# Patient Record
Sex: FEMALE | Race: WHITE | Employment: UNEMPLOYED | URBAN - METROPOLITAN AREA
[De-identification: names, ages, dates, MRNs, and addresses within clinical notes are randomized per-mention and may not be internally consistent; named-entity substitution may affect disease eponyms.]

---

## 2020-04-09 ENCOUNTER — VIRTUAL VISIT (OUTPATIENT)
Dept: PRIMARY CARE CLINIC | Age: 35
End: 2020-04-09

## 2020-04-09 DIAGNOSIS — F32.A DEPRESSION, UNSPECIFIED DEPRESSION TYPE: ICD-10-CM

## 2020-04-09 DIAGNOSIS — Z76.89 ENCOUNTER TO ESTABLISH CARE WITH NEW DOCTOR: ICD-10-CM

## 2020-04-09 DIAGNOSIS — F41.9 ANXIETY: Primary | ICD-10-CM

## 2020-04-09 DIAGNOSIS — G47.9 SLEEP DISTURBANCE: ICD-10-CM

## 2020-04-09 RX ORDER — BUSPIRONE HYDROCHLORIDE 7.5 MG/1
7.5 TABLET ORAL 3 TIMES DAILY
Qty: 90 TAB | Refills: 0 | Status: SHIPPED | OUTPATIENT
Start: 2020-04-09

## 2020-04-09 RX ORDER — TRAZODONE HYDROCHLORIDE 50 MG/1
50 TABLET ORAL
Qty: 30 TAB | Refills: 1 | Status: SHIPPED | OUTPATIENT
Start: 2020-04-09

## 2020-04-09 NOTE — PROGRESS NOTES
Consent: Renato aHmmer, who was seen by synchronous (real-time) audio-video technology, and/or her healthcare decision maker, is aware that this patient-initiated, Telehealth encounter on 4/9/2020 is a billable service, with coverage as determined by her insurance carrier. She is aware that she may receive a bill and has provided verbal consent to proceed: NA - Consent obtained within past 12 months. Assessment & Plan:   Diagnoses and all orders for this visit:    1. Anxiety  -   Start   busPIRone (BUSPAR) 7.5 mg tablet; Take 1 Tab by mouth three (3) times daily. Advised that she needs to make appointment with psychiatrist. [facility]. Patient reports that she called couple of practices but due to this COVID-19 issues they are not taking any new patient. 2. Depression, unspecified depression type  -   Start   traZODone (DESYREL) 50 mg tablet; Take 1 Tab by mouth nightly. Patient expected to start other meds as well but I advised that Its not safe to start different psych med at a time due to the concern of side effects. Advised to have a close follow up until she has an appointment with psychiatrist. Bertha Wyman that I will adjust, add med as we go until she has appointment with psych as much as I can. 3. Sleep disturbance  -    Start  traZODone (DESYREL) 50 mg tablet; Take 1 Tab by mouth nightly. 4. Encounter to establish care with new doctor       Same as #1,2,3. Follow-up and Dispositions    · Return in about 1 month (around 5/9/2020), or if symptoms worsen or fail to improve, for depression, anxiety. .                 I spent at least 30 minutes with this new patient, and >50% of the time was spent counseling and/or coordinating care regarding anxiety, depression. 712  Subjective:   Renato Hammer is a 27 y.o. female who was seen for Establish Care; Anxiety; and Depression    Hx of anxiety and depression for many years. She think she was diagnosed with depression and anxiety since she was 22 and she believe she her symptoms started after she had her first child when she was 21 y/o. She was diagnosed with postpartum depression and started on different meds but  she admit that she would always stop taking the meds after 6 months of trial.     Seen psychiatrist about 8 years ago and diagnosed with Bipolar 1 disorder. She was started on Abilify, Risperdal but she took for few months and stopped by herself. She has been experiencing mood swings. Feels depressed some days and some days she feels active. States that she jumps into different tasks, can't finish. Feels anxious. 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 Has been experiencing anxiety at work. She moved from [place] on 3/6/2020. She has 8,10 and 11 y/o childres in [place]. She does not have custody of her Childrens. States that she moves back and forth from different states. She was on Prozac, zoloft, benzos in different times. She stopped those meds by herself. She reports that she was on trazodone for sleep difficulties which worked well for Insomnia. She c/o sleeping difficulties. Prior to Admission medications    Medication Sig Start Date End Date Taking? Authorizing Provider   HYDROcodone-acetaminophen (NORCO) 5-325 mg per tablet Take 1 Tab by mouth every four (4) hours as needed. 8/17/12   Kev Rooney MD   ibuprofen (MOTRIN) 800 mg tablet Take 1 Tab by mouth every eight (8) hours. 8/17/12   Kev Rooney MD   prenatal multivit-ca-min-fe-fa Tab Take  by mouth. Provider, Historical   risperiDONE (RISPERDAL) 1 mg tablet Take 1 mg by mouth daily. Provider, Historical   ARIPiprazole (ABILIFY) 10 mg tablet Take 10 mg by mouth daily. Indications: DEPRESSION TREATMENT ADJUNCT    Provider, Historical   ranitidine (ZANTAC) 150 mg tablet Take 150 mg by mouth two (2) times a day.     Indications: GASTROESOPHAGEAL REFLUX    Provider, Historical     No Known Allergies    Patient Active Problem List   Diagnosis Code    Active labor [id number]     Current Outpatient Medications   Medication Sig Dispense Refill    traZODone (DESYREL) 50 mg tablet Take 1 Tab by mouth nightly. 30 Tab 1    busPIRone (BUSPAR) 7.5 mg tablet Take 1 Tab by mouth three (3) times daily. 90 Tab 0    ibuprofen (MOTRIN) 800 mg tablet Take 1 Tab by mouth every eight (8) hours. 30 Tab 0    risperiDONE (RISPERDAL) 1 mg tablet Take 1 mg by mouth daily.  ARIPiprazole (ABILIFY) 10 mg tablet Take 10 mg by mouth daily. Indications: DEPRESSION TREATMENT ADJUNCT       No Known Allergies  Past Medical History:   Diagnosis Date    Anxiety     Bipolar [facility] disorder ([facility] [address]     Depression     Postpartum depression     on Abilify and off was Rispidone    Psychiatric problem     Bipolar on Risperdone but off to to moving     Past Surgical History:   Procedure Laterality Date    HX WISDOM TEETH EXTRACTION       Family History   Problem Relation Age of Onset    Lung Disease Brother     Psychiatric Disorder Brother     COPD Mother     Drug Abuse Mother     Depression Father      Social History     Tobacco Use    Smoking status: Current Every Day Smoker     Years: 10.00    Smokeless tobacco: Never Used    Tobacco comment: quit 3 years ago. Substance Use Topics    Alcohol use: No     Frequency: Never       Review of Systems   Constitutional: Negative for chills and fever. Respiratory: Negative for cough, shortness of breath and wheezing. Cardiovascular: Negative for chest pain. Psychiatric/Behavioral: Positive for depression. Negative for hallucinations and substance abuse. The patient is nervous/anxious. No bad thoughts       Refer to HPI. All other ROS is negative. Objective:   Vital Signs: (As obtained by patient/caregiver at home)  There were no vitals taken for this visit.      [INSTRUCTIONS:  \"[x]\" Indicates a positive item  \"[]\" Indicates a negative item  -- DELETE ALL ITEMS NOT EXAMINED]    Constitutional: [x] Appears well-developed and well-nourished [x] No apparent distress      [] Abnormal -     Mental status: [x] Alert and awake  [x] Oriented to person/place/time [x] Able to follow commands    [] Abnormal -     Eyes:   EOM    [x]  Normal    [] Abnormal -   Sclera  [x]  Normal    [] Abnormal -          Discharge [x]  None visible   [] Abnormal -     HENT: [x] Normocephalic, atraumatic  [] Abnormal -   [x] Mouth/Throat: Mucous membranes are moist    External Ears [x] Normal  [] Abnormal -    Neck: [x] No visualized mass [] Abnormal -     Pulmonary/Chest: [x] Respiratory effort normal   [x] No visualized signs of difficulty breathing or respiratory distress        [] Abnormal -      Musculoskeletal:   [x] Normal gait with no signs of ataxia         [x] Normal range of motion of neck        [] Abnormal -     Neurological:        [x] No Facial Asymmetry (Cranial nerve 7 motor function) (limited exam due to video visit)          [x] No gaze palsy        [] Abnormal -          Skin:        [x] No significant exanthematous lesions or discoloration noted on facial skin         [] Abnormal -            Psychiatric:       [x] Normal Affect [] Abnormal -        [x] No Hallucinations    Other pertinent observable physical exam findings:-        We discussed the expected course, resolution and complications of the diagnosis(es) in detail. Medication risks, benefits, costs, interactions, and alternatives were discussed as indicated. I advised her to contact the office if her condition worsens, changes or fails to improve as anticipated. She expressed understanding with the diagnosis(es) and plan. Isaias Rodriguez is a 27 y.o. female being evaluated by a video visit encounter for concerns as above. A caregiver was present when appropriate.  Due to this being a TeleHealth encounter (During [facility]- public health emergency), evaluation of the following organ systems was limited: Vitals/Constitutional/EENT/Resp/CV/GI//MS/Neuro/Skin/Heme-Lymph-Imm. Pursuant to the emergency declaration under the [address] [place] [address], [facility] waiver authority and the [employer] Act, this Virtual  Visit was conducted, with patient's (and/or legal guardian's) consent, to reduce the patient's risk of exposure to COVID-19 and provide necessary medical care. Services were provided through a video synchronous discussion virtually to substitute for in-person clinic visit. Patient and provider were located at their individual homes.         Fitz Crump MD